# Patient Record
Sex: MALE | Race: BLACK OR AFRICAN AMERICAN | Employment: FULL TIME | URBAN - METROPOLITAN AREA
[De-identification: names, ages, dates, MRNs, and addresses within clinical notes are randomized per-mention and may not be internally consistent; named-entity substitution may affect disease eponyms.]

---

## 2019-06-17 ENCOUNTER — OFFICE VISIT (OUTPATIENT)
Dept: ORTHOPEDIC SURGERY | Facility: CLINIC | Age: 44
End: 2019-06-17

## 2019-06-17 VITALS
WEIGHT: 165.2 LBS | DIASTOLIC BLOOD PRESSURE: 85 MMHG | SYSTOLIC BLOOD PRESSURE: 117 MMHG | BODY MASS INDEX: 27.52 KG/M2 | HEIGHT: 65 IN | RESPIRATION RATE: 16 BRPM | OXYGEN SATURATION: 98 % | TEMPERATURE: 98.6 F | HEART RATE: 87 BPM

## 2019-06-17 DIAGNOSIS — M75.51 ACUTE SHOULDER BURSITIS, RIGHT: ICD-10-CM

## 2019-06-17 DIAGNOSIS — M75.52 ACUTE BURSITIS OF LEFT SHOULDER: ICD-10-CM

## 2019-06-17 DIAGNOSIS — M77.01 MEDIAL EPICONDYLITIS OF ELBOW, RIGHT: Primary | ICD-10-CM

## 2019-06-17 RX ORDER — SIMVASTATIN 20 MG/1
TABLET, FILM COATED ORAL
Refills: 3 | COMMUNITY
Start: 2019-06-12

## 2019-06-17 RX ORDER — METFORMIN HYDROCHLORIDE 500 MG/1
TABLET ORAL
Refills: 2 | COMMUNITY
Start: 2019-06-12

## 2019-06-17 RX ORDER — IBUPROFEN 800 MG/1
TABLET ORAL
Refills: 3 | COMMUNITY
Start: 2019-06-12 | End: 2022-02-18

## 2019-06-17 RX ORDER — RANITIDINE 150 MG/1
TABLET, FILM COATED ORAL
Refills: 3 | COMMUNITY
Start: 2019-06-12 | End: 2022-02-18

## 2019-06-17 RX ORDER — AMLODIPINE BESYLATE 10 MG/1
TABLET ORAL
Refills: 3 | COMMUNITY
Start: 2019-06-12 | End: 2022-02-18

## 2019-06-17 RX ORDER — BETAMETHASONE SODIUM PHOSPHATE AND BETAMETHASONE ACETATE 3; 3 MG/ML; MG/ML
6 INJECTION, SUSPENSION INTRA-ARTICULAR; INTRALESIONAL; INTRAMUSCULAR; SOFT TISSUE ONCE
Qty: 0.5 ML | Refills: 0
Start: 2019-06-17 | End: 2019-06-17

## 2019-06-17 RX ORDER — LISINOPRIL 40 MG/1
TABLET ORAL
Refills: 3 | COMMUNITY
Start: 2019-06-12 | End: 2022-02-18

## 2019-06-17 NOTE — PROGRESS NOTES
Patient: Brianna Che                MRN: [id number]       SSN: [id number]  YOB: 1975        AGE: 39 y.o. SEX: male    PCP: Epifania Hatchet, NP  06/17/19    Chief Complaint   Patient presents with    Shoulder Pain     Misael    Elbow Pain     Right     HISTORY:  Brianna Che is a 39 y.o. male who is seen for bilateral shoulder and right elbow pain. He injured his shoulders in [employer] exercises. He fell and impacted both shoulders. He dislocated his right shoulder and had heavy bruising of his left shoulder. He had his right shoulder reduced at a [employer] facility. He responded to PT and had cortisone injections with his last injection being 4 years ago in [place].  He has pain when pulling/pushing laundry at work. He has right medial epicondylar pain. He has medial elbow pain with gripping, lifting, and folding laundry at work. His repetitive activities increase his elbow pain. Pain Assessment  6/17/2019   Location of Pain Elbow   Location Modifiers Right   Severity of Pain 3   Quality of Pain Sharp;Burning   Duration of Pain Persistent   Frequency of Pain Intermittent   Aggravating Factors Bending;Stretching;Straightening   Limiting Behavior Yes   Relieving Factors Rest;NSAID   Result of Injury No     Occupation, etc:  Mr. Vinicius Pace works as a  at [employer]. He had served 4 years in the [employer]. He is metformin controlled diabetic. He recently lost 50 pounds from physical activity at work. He moved to the area from [place] 1.5 years ago. In [place], he was an  for [employer]. He lives in [place] with his sister. His parents were originally from the area. He has one 22 yo son, and a 13 yo and 12 yo son that live in [place]. Mr. Vinicius Pace weighs 165 lbs and is 5'5\" tall.        No results found for: HBA1C, HGBE8, SJV0MBYL, EAR9QAUU, VOW3YEEU  Weight Metrics 6/17/2019   Weight 165 lb 3.2 oz   BMI 27.49 kg/m2       There is no problem list on file for this patient. REVIEW OF SYSTEMS: All Below are Negative except: See HPI   Constitutional: negative for fever, chills, and weight loss. Cardiovascular: negative for chest pain, claudication, leg swelling, SOB, ALONZO   Gastrointestinal: Negative for pain, N/V/C/D, Blood in stool or urine, dysuria, hematuria, incontinence, pelvic pain. Musculoskeletal: See HPI   Neurological: Negative for dizziness and weakness. Negative for headaches, Visual changes, confusion, seizures   Phychiatric/Behavioral: Negative for depression, memory loss, substance abuse. Extremities: Negative for hair changes, rash, or skin lesion changes. Hematologic: Negative for bleeding problems, bruising, pallor or swollen lymph nodes   Peripheral Vascular: No calf pain, no circulation deficits.     Social History     Socioeconomic History    Marital status: SINGLE     Spouse name: Not on file    Number of children: Not on file    Years of education: Not on file    Highest education level: Not on file   Occupational History    Not on file   Social Needs    Financial resource strain: Not on file    Food insecurity:     Worry: Not on file     Inability: Not on file    Transportation needs:     Medical: Not on file     Non-medical: Not on file   Tobacco Use    Smoking status: Never Smoker    Smokeless tobacco: Never Used   Substance and Sexual Activity    Alcohol use: Not Currently    Drug use: Never    Sexual activity: Not on file   Lifestyle    Physical activity:     Days per week: Not on file     Minutes per session: Not on file    Stress: Not on file   Relationships    Social connections:     Talks on phone: Not on file     Gets together: Not on file     Attends Christianity service: Not on file     Active member of club or organization: Not on file     Attends meetings of clubs or organizations: Not on file     Relationship status: Not on file    Intimate partner violence: Fear of current or ex partner: Not on file     Emotionally abused: Not on file     Physically abused: Not on file     Forced sexual activity: Not on file   Other Topics Concern    Not on file   Social History Narrative    Not on file      No Known Allergies   Current Outpatient Medications   Medication Sig    amLODIPine (NORVASC) 10 mg tablet TAKE 1 TABLET BY MOUTH ONCE DAILY FOR 30 DAYS    ibuprofen (MOTRIN) 800 mg tablet TAKE 1 TABLET BY MOUTH AS NEEDED WITH FOOD THREE TIMES DAILY    lisinopril (PRINIVIL, ZESTRIL) 40 mg tablet TAKE 1 TABLET BY MOUTH ONCE DAILY FOR 30 DAYS    metFORMIN (GLUCOPHAGE) 500 mg tablet TAKE 1 TABLET BY MOUTH TWICE DAILY WITH MEALS    raNITIdine (ZANTAC) 150 mg tablet TAKE 1 TABLET BY MOUTH ONCE DAILY FOR 30 DAYS    simvastatin (ZOCOR) 20 mg tablet TAKE 1 TABLET BY MOUTH ONCE DAILY IN THE EVENING FOR 30 DAYS     No current facility-administered medications for this visit. PHYSICAL EXAMINATION:  Visit Vitals  /85   Pulse 87   Temp 98.6 °F (37 °C) (Oral)   Resp 16   Ht 5' 5\" (1.651 m)   Wt 165 lb 3.2 oz (74.9 kg)   SpO2 98%   BMI 27.49 kg/m²      ORTHO EXAMINATION:  Examination Right shoulder Left shoulder   Skin Intact Intact   Effusion - -   Biceps deformity - -   Atrophy - -   AC joint tenderness - -   Acromial tenderness + +   Biceps tenderness - -   Forward flexion/Elevation  fletcher beyond 90, pain returning arm to side 170   Active abduction  160   External rotation ROM 30 30   Internal rotation ROM 90 90   Apprehension - -   Impingement - -   Drop Arm Test - -   Neurovascular Intact Intact     Examination Right Elbow Left Elbow   Skin Intact Intact   Range of Motion 140-0 135-0   Tenderness + medial epicondyle -   Swelling - -   Bruising - -   Stability Normal Normal   Motor Strength  Normal Normal   Neurovascular Intact Intact        RADIOGRAPHS:  XR RIGHT SHOULDER 4/5/19 [facility]  IMPRESSION:  Negative right shoulder.     Chart reviewed for the following:   Jenniffer Nogueira MD, have reviewed the History, Physical and updated the Allergic reactions for Lea performed immediately prior to start of procedure:  Jenniffer Nogueira MD, have performed the following reviews on Zain Perez prior to the start of the procedure:            * Patient was identified by name and date of birth   * Agreement on procedure being performed was verified  * Risks and Benefits explained to the patient  * Procedure site verified and marked as necessary  * Patient was positioned for comfort  * Consent was obtained     Time: 4:47 PM     Date of procedure: 6/17/2019    Procedure performed by:  Dia Dee MD    Mr. Gambino tolerated the procedure well with no complications. IMPRESSION:      ICD-10-CM ICD-9-CM    1. Medial epicondylitis of elbow, right M77.01 726.31 betamethasone (CELESTONE SOLUSPAN) 6 mg/mL injection      BETAMETHASONE ACETATE & SODIUM PHOSPHATE INJECTION 3 MG EA.      PA INJECT TENDON SHEATH/LIGAMENT   2. Acute shoulder bursitis, right M75.51 726.10 betamethasone (CELESTONE SOLUSPAN) 6 mg/mL injection      BETAMETHASONE ACETATE & SODIUM PHOSPHATE INJECTION 3 MG EA.      DRAIN/INJECT LARGE JOINT/BURSA   3. Acute bursitis of left shoulder M75.52 726.10 betamethasone (CELESTONE SOLUSPAN) 6 mg/mL injection      BETAMETHASONE ACETATE & SODIUM PHOSPHATE INJECTION 3 MG EA.      DRAIN/INJECT LARGE JOINT/BURSA     PLAN:  After discussing treatment options, patient's shoulders and right medial epicondyle were injected with 4 cc Marcaine and 1/2 cc Celestone. We discussed a possible need for shoulder MRI in the future if pain continues. He will follow up as needed.       Scribed by Myranda Gabriel ([address]) as dictated by Dia Dee MD